# Patient Record
Sex: MALE | Race: WHITE
[De-identification: names, ages, dates, MRNs, and addresses within clinical notes are randomized per-mention and may not be internally consistent; named-entity substitution may affect disease eponyms.]

---

## 2017-01-12 ENCOUNTER — HOSPITAL ENCOUNTER (OUTPATIENT)
Dept: HOSPITAL 45 - C.LAB1850 | Age: 62
Discharge: HOME | End: 2017-01-12
Attending: SURGERY
Payer: COMMERCIAL

## 2017-01-12 DIAGNOSIS — Z01.812: Primary | ICD-10-CM

## 2017-01-12 DIAGNOSIS — D17.1: ICD-10-CM

## 2017-01-12 LAB
ANION GAP SERPL CALC-SCNC: 10 MMOL/L (ref 3–11)
BASOPHILS # BLD: 0.05 K/UL (ref 0–0.2)
BASOPHILS NFR BLD: 0.6 %
BUN SERPL-MCNC: 22 MG/DL (ref 7–18)
BUN/CREAT SERPL: 21.7 (ref 10–20)
CALCIUM SERPL-MCNC: 9.2 MG/DL (ref 8.5–10.1)
CHLORIDE SERPL-SCNC: 105 MMOL/L (ref 98–107)
CO2 SERPL-SCNC: 25 MMOL/L (ref 21–32)
COMPLETE: YES
CREAT SERPL-MCNC: 0.99 MG/DL (ref 0.6–1.4)
EOSINOPHIL NFR BLD AUTO: 175 K/UL (ref 130–400)
GLUCOSE SERPL-MCNC: 74 MG/DL (ref 70–99)
HCT VFR BLD CALC: 47.3 % (ref 42–52)
IG%: 0.3 %
IMM GRANULOCYTES NFR BLD AUTO: 21.1 %
LYMPHOCYTES # BLD: 1.66 K/UL (ref 1.2–3.4)
MCH RBC QN AUTO: 33 PG (ref 25–34)
MCHC RBC AUTO-ENTMCNC: 37 G/DL (ref 32–36)
MCV RBC AUTO: 89.1 FL (ref 80–100)
MONOCYTES NFR BLD: 7.8 %
NEUTROPHILS # BLD AUTO: 1 %
NEUTROPHILS NFR BLD AUTO: 69.2 %
PMV BLD AUTO: 11.5 FL (ref 7.4–10.4)
POTASSIUM SERPL-SCNC: 4.3 MMOL/L (ref 3.5–5.1)
RBC # BLD AUTO: 5.31 M/UL (ref 4.7–6.1)
SODIUM SERPL-SCNC: 140 MMOL/L (ref 136–145)
WBC # BLD AUTO: 7.87 K/UL (ref 4.8–10.8)

## 2017-01-13 ENCOUNTER — HOSPITAL ENCOUNTER (OUTPATIENT)
Dept: HOSPITAL 45 - C.CPL | Age: 62
Discharge: HOME | End: 2017-01-13
Attending: SURGERY
Payer: COMMERCIAL

## 2017-01-13 DIAGNOSIS — D17.1: Primary | ICD-10-CM

## 2017-01-17 ENCOUNTER — HOSPITAL ENCOUNTER (OUTPATIENT)
Dept: HOSPITAL 45 - X.SURG | Age: 62
Discharge: HOME | End: 2017-01-17
Attending: SURGERY
Payer: COMMERCIAL

## 2017-01-17 VITALS
BODY MASS INDEX: 27.46 KG/M2 | HEIGHT: 69.02 IN | BODY MASS INDEX: 27.46 KG/M2 | HEIGHT: 69.02 IN | WEIGHT: 185.39 LBS | WEIGHT: 185.39 LBS

## 2017-01-17 VITALS — DIASTOLIC BLOOD PRESSURE: 82 MMHG | HEART RATE: 69 BPM | OXYGEN SATURATION: 98 % | SYSTOLIC BLOOD PRESSURE: 119 MMHG

## 2017-01-17 VITALS — TEMPERATURE: 97.7 F

## 2017-01-17 DIAGNOSIS — N41.0: ICD-10-CM

## 2017-01-17 DIAGNOSIS — D21.3: Primary | ICD-10-CM

## 2017-01-17 DIAGNOSIS — M19.90: ICD-10-CM

## 2017-01-17 DIAGNOSIS — R53.83: ICD-10-CM

## 2017-01-17 DIAGNOSIS — G47.30: ICD-10-CM

## 2017-01-17 NOTE — ANESTHESIA PROGRESS NT - [facility]
Anesthesia Post Op Note


Date & Time


Jan 17, 2017 at 14:19





Vital Signs


Pain Intensity:  0





 Vital Signs Past 12 Hours








  Date Time  Temp Pulse Resp B/P Pulse Ox O2 Delivery O2 Flow Rate FiO2


 


1/17/17 13:49 36.5 71 16 113/70 96 Room Air  


 


1/17/17 12:09 36.6 80 16 124/84 98 Room Air  











Notes


Mental Status:  alert / awake / arousable, participated in evaluation


Pt Amnestic to Procedure:  Yes


Nausea / Vomiting:  adequately controlled


Pain:  adequately controlled


Airway Patency, RR, SpO2:  stable & adequate


BP & HR:  stable & adequate


Hydration State:  stable & adequate


Anesthetic Complications:  no major complications apparent

## 2017-01-17 NOTE — DISCHARGE INSTRUCTIONS-[facility]
Discharge Instructions


Visit


Reason for Visit:  Left Chest Lipoma





Discharge


Discharge Diagnosis / Problem:  lipoma





Discharge Goals


Goal(s):  Decrease discomfort, Improve function, Improve disease control





Activity Recommendations


Activity Limitations:  as noted below


Lifting Limitations:  no more than 25 pounds


Exercise/Sports Limitations:  until after follow-up appointment


May Resume Sexual Activity:  when tolerated


Shower/Bathe:  keep incision dry (may shower over incision on Thur 1/19)


Driving or Machine Use:  resume 1 day after discharge





SPECIAL CARE INSTRUCTIONS:





*  Cover incisions and change daily for comfort/drainage.





*  Leave steri strips in place





*  May use ibuprofen for pain as tolerated.





*  Expect some swelling and bruising.





Call your doctor if:





*  Temperature above 101 degrees





*  Pain not relieved by pain medicine ordered





*  There is increased drainage or redness from any incision





*  You have any unanswered questions or concerns [phone].








FOLLOW UP VISIT:





If not already scheduled, please call the office for a follow-up visit.





for next week- suture removal





OFFICE PHONE NUMBER:





Dr. Van Office Phone Number:  [phone]








Anesthesia


.





Post Anesthesia Instructions:





If you have had General Anesthesia or IV Sedation:





*  Do not drive today.


*  Resume driving when surgeon permits.


*  Do not make important decisions or sign legal documents today.


*  Call surgeon for:





   1.  Temperature elevations greater than 101 degrees F.


   2.  Uncontrollable pain.


   3.  Excessive bleeding.


   4.  Persistent nausea and vomiting.


   5.  Medication intolerance (nausea, vomiting or rash).





*  For nausea and vomiting use only clear liquids such as: tea, soda, bouillon 

until nausea subsides, then gradually increase diet as tolerated.





*  If you have any concerns or questions, call your surgeon's office.  If 

physician is unavailable and it is an emergency, call 911 or go to the nearest 

emergency room.





.





Diet Recommendations


Home Diet:  resume previous diet





Procedures


Procedures Performed:  


Left Chest Lipoma Excision





Pending Studies


Studies pending at discharge:  no





Medical Emergencies








.


Who to Call and When:





Medical Emergencies:  If at any time you feel your situation is an emergency, 

please call 911 immediately.





.





Non-Emergent Contact


Non-Emergency issues call your:  Surgeon





.


.





"Provider Documentation" section prepared by Bogdan Van.

## 2017-01-17 NOTE — [facility] OPERATIVE REPORT
Operative Report


Operative Date


Jan 17, 2017.





Pre-Operative Diagnosis





left chest lipoma





Post-Operative Diagnosis


same





Procedure(s) Performed


excision Lt chest lipoma





Surgeon


dr su





Assistant Surgeon(s)


none





Estimated Blood Loss


5CC





Findings


4x3.5 cm intramuscular lipoma





Specimens





a lipoma left chest





Anesthesia


local / sedation





Complication(s)


None





Disposition


Recovery Room / PACU


I attest to the content of the Intraoperative Record and any orders documented 

therein.  Any exceptions are noted below.

## 2017-01-17 NOTE — OPERATIVE REPORT
DATE OF OPERATION:  01/17/2017

 

PREOPERATIVE DIAGNOSIS:  Left chest lipoma.

 

POSTOPERATIVE DIAGNOSIS:  Same.  

 

NAME OF OPERATION:  Excision of 4 x 3.5 cm lipoma.

 

FINDINGS:  The lipoma was intermuscular in the serratus muscle on the left

side of the chest.

 

STAFF SURGEON: Dr. Van.

 

ANESTHESIA:  1%  plain lidocaine with sedation.

 

PROCEDURE:  The patient was brought in the operating room and placed on the

operating room table in supine position.  His left arm was extended onto an

arm board.  His left chest was prepped and draped in usual fashion.  Using 1%

plain lidocaine incision was made in the left chest, carrying dissection down

through the subcutaneous tissue and then the latissimus dorsi muscle  was

posterior and the lipoma was obviously intramuscular within the serratus

muscle.  It was gently dissected away from the muscle fibers being careful

not to cut the muscle fibers and excising the lipoma which was 4 x 3.5 cm in

diameter.  The deep muscle was reapproximated in the bed of the lipoma using

2-0 chromic catgut suture and then the subcutaneous tissue reapproximated

using 2-0 plain catgut suture, then the skin reapproximated using

subcuticular 5-0 Monocryl with the knots  on the outside of the skin with

Steri-Strips applied.  The patient was transferred to recovery room in stable

condition.

 

 

I attest to the content of the Intraoperative Record and any orders documented therein. Any exceptio
ns are noted below.

## 2017-07-26 ENCOUNTER — HOSPITAL ENCOUNTER (OUTPATIENT)
Dept: HOSPITAL 45 - C.LAB1850 | Age: 62
Discharge: HOME | End: 2017-07-26
Attending: INTERNAL MEDICINE
Payer: COMMERCIAL

## 2017-07-26 DIAGNOSIS — E78.00: Primary | ICD-10-CM

## 2017-07-26 DIAGNOSIS — N40.1: ICD-10-CM

## 2017-07-26 LAB
ALT SERPL-CCNC: 40 U/L (ref 12–78)
ANION GAP SERPL CALC-SCNC: 5 MMOL/L (ref 3–11)
AST SERPL-CCNC: 20 U/L (ref 15–37)
BUN SERPL-MCNC: 25 MG/DL (ref 7–18)
BUN/CREAT SERPL: 24.7 (ref 10–20)
CALCIUM SERPL-MCNC: 9.2 MG/DL (ref 8.5–10.1)
CHLORIDE SERPL-SCNC: 107 MMOL/L (ref 98–107)
CHOLEST/HDLC SERPL: 3 {RATIO}
CO2 SERPL-SCNC: 30 MMOL/L (ref 21–32)
CREAT SERPL-MCNC: 1 MG/DL (ref 0.6–1.4)
GLUCOSE SERPL-MCNC: 87 MG/DL (ref 70–99)
GLUCOSE UR QL: 61 MG/DL
KETONES UR QL STRIP: 104 MG/DL
NITRITE UR QL STRIP: 78 MG/DL (ref 0–150)
PH UR: 181 MG/DL (ref 0–200)
POTASSIUM SERPL-SCNC: 4.7 MMOL/L (ref 3.5–5.1)
PSA SERPL-MCNC: 0.7 NG/ML (ref 0–4)
SODIUM SERPL-SCNC: 142 MMOL/L (ref 136–145)
VERY LOW DENSITY LIPOPROT CALC: 16 MG/DL

## 2018-02-19 ENCOUNTER — HOSPITAL ENCOUNTER (OUTPATIENT)
Dept: HOSPITAL 45 - C.GI | Age: 63
Discharge: HOME | End: 2018-02-19
Attending: INTERNAL MEDICINE
Payer: COMMERCIAL

## 2018-02-19 VITALS
BODY MASS INDEX: 26.54 KG/M2 | WEIGHT: 185.39 LBS | HEIGHT: 70 IN | WEIGHT: 185.39 LBS | HEIGHT: 70 IN | BODY MASS INDEX: 26.54 KG/M2

## 2018-02-19 VITALS — OXYGEN SATURATION: 98 % | SYSTOLIC BLOOD PRESSURE: 121 MMHG | DIASTOLIC BLOOD PRESSURE: 88 MMHG | HEART RATE: 71 BPM

## 2018-02-19 DIAGNOSIS — Z12.11: Primary | ICD-10-CM

## 2018-02-19 DIAGNOSIS — G47.33: ICD-10-CM

## 2018-02-19 DIAGNOSIS — K64.8: ICD-10-CM

## 2018-02-19 DIAGNOSIS — M19.90: ICD-10-CM

## 2018-02-19 DIAGNOSIS — Z85.820: ICD-10-CM

## 2018-02-19 DIAGNOSIS — Z99.89: ICD-10-CM

## 2018-02-19 DIAGNOSIS — Z90.49: ICD-10-CM

## 2018-02-19 DIAGNOSIS — N40.0: ICD-10-CM

## 2018-02-19 NOTE — GI REPORT
Procedure Date: 2/19/2018 10:33 AM

Procedure:            Colonoscopy

Indications:          Screening for colorectal malignant neoplasm

Medicines:            Monitored Anesthesia Care

Complications:        No immediate complications.

Estimated Blood Loss: Estimated blood loss: none.

Procedure:            Pre-Anesthesia Assessment:

                      - Prior to the procedure, a History and Physical was 

                      performed, and patient medications and allergies were 

                      reviewed. The patient's tolerance of previous 

                      anesthesia was also reviewed. The risks and benefits of 

                      the procedure and the sedation options and risks were 

                      discussed with the patient. All questions were 

                      answered, and informed consent was obtained. Prior 

                      Anticoagulants: The patient has taken no previous 

                      anticoagulant or antiplatelet agents. ASA Grade 

                      Assessment: I - A normal, healthy patient. After 

                      reviewing the risks and benefits, the patient was 

                      deemed in satisfactory condition to undergo the 

                      procedure.

                      After I obtained informed consent, the scope was passed 

                      under direct vision. Throughout the procedure, the 

                      patient's blood pressure, pulse, and oxygen saturations 

                      were monitored continuously. The scope was introduced 

                      through the anus and advanced to the terminal ileum. 

                      The colonoscopy was performed without difficulty. The 

                      patient tolerated the procedure well. The quality of 

                      the bowel preparation was good. The terminal ileum, 

                      ileocecal valve, appendiceal orifice, and rectum were 

                      photographed.

Findings:

     The perianal and digital rectal examinations were normal.

     Non-bleeding internal hemorrhoids were found during retroflexion. The 

     hemorrhoids were small.

Impression:           - Non-bleeding internal hemorrhoids.

                      - No specimens collected.

Recommendation:       - Resume previous diet.

                      - Continue present medications.

                      - Repeat colonoscopy in 10 years for surveillance.

                      - Return to primary care physician as previously 

                      scheduled.

Doni Callahan DO

2/19/2018 11:17:52 AM

This report has been signed electronically.

Note Initiated On: 2/19/2018 10:33 AM

     I attest to the content of the Intraoperative Record and orders 

     documented therein, exceptions below

## 2018-02-19 NOTE — DISCHARGE INSTRUCTIONS
Endoscopy Patient Instructions


Date / Procedure(s) Performed


Feb 19, 2018.


Colonoscopy





Allergy Information


Coded Allergies:  


     No Known Allergies (Verified , 2/19/18)





Discharge Date / Findings


Feb 19, 2018.


Internal hemorrhoids





Medication Instructions


OK to resume all medications today as prescribed





Reported Home Medications








 Medications  Dose


 Route/Sig


 Max Daily Dose Days Date Category


 


 Advil (Ibuprofen)


 200 Mg Tab  200-600 Mg


 PO Q4H PRN


    1/11/17 Reported


 


 Omega-3 (Fish


 Oil) 1 Ea Cap  1 Cap


 PO QAM


    1/11/17 Reported


 


 Probiotic


  (Probiotic


 Product) 1 Cap Cap  1 Cap


 PO QAM


    12/16/15 Reported


 


 Allegra Allergy


  (Fexofenadine


 Hcl) 180 Mg Tab  1 Tab


 PO QAM


    12/16/15 Reported











Provider Instructions





Activity Restrictions





-  No exercising or heavy lifting for 24 hours. 


-  Do not drink alcohol the day of the procedure.


-  Do not drive a car or operate machinery until the day after the procedure.


-  Do not make any important decisions or sign important papers in 24 hours 

after the procedure.





Following Day:





-  Return to full activity which may include returning to work/school.





Diet





Start your diet with liquids and light foods (jello, soup, juice, toast).  Then 

eat your usual diet if not nauseated.





Treatment For Common After Affects





For mild abdominal pain, bloating, or excessive gas:





-  Rest


-  Eat lightly


-  Lie on right side





Follow-Up Information


Follow-up with Dr. Noel Prakash as scheduled





Anesthesia Information





What You Should Know





You have had a procedure that required some medicine to reduce anxiety and 

discomfort. This treatment is called moderate sedation.  


After receiving the treatment, you may be sleepy, but you will be able to 

breathe on your own.  The effects of the treatment may last for several hours.








Follow these instructions along with Activity/Diet recommendations noted above:





*  Do NOT do anything where dizziness or clumsiness would be dangerous.





*  Rest quietly at home today, then you can be up and about tomorrow.





*  Have a responsible person stay with you the rest of today.





*  You may have had an I.V. today.  If so, you may take the dressing off later 

today.





Recommendations


 


Call your doctor if:





*  Trouble breathing 





*  Continuous vomiting for more than 24 hours








*  Temperature above 101 degrees





*  Severe abdominal pain or bloating





*  Pain not relieved by pain medicine ordered





*  There is increased drainage or redness from any incision





*  A large amount of rectal bleeding greater than 2-3 tablespoons. 


   (If you had a polyp/s removed or have hemorrhoids, a small amount of blood -


    from the rectum is to be expected.)





*  You have any unanswered questions or concerns.








IN THE EVENT OF A SERIOUS EMERGENCY, GO TO THE NEAREST EMERGENCY ROOM








       Your discharge instructions were prepared by provider Doni Callahan.





 Patient Instructions Signature Page














Noel Slaughter 











Patient (or Guardian) Signature/Date:____________________________________ I 

have read and understand the instructions given to me by my caregivers.








Caregiver/RN/Doctor Signature/Date:____________________________________











The above-named patient and/or guardian has received patient instructions on 

this date.





























+  Original Patient Signature Page (only) stays with chart.  Please make copy 

for patient.

## 2018-02-19 NOTE — ANESTHESIOLOGY PROGRESS NOTE
Anesthesia Post Op Note


Date & Time


Feb 19, 2018 at 11:40





Vital Signs


Pain Intensity:  0





Vital Signs Past 12 Hours








  Date Time  Temp Pulse Resp B/P (MAP) Pulse Ox O2 Delivery O2 Flow Rate FiO2


 


2/19/18 11:15  60 18 103/65 (78) 95 Room Air  


 


2/19/18 10:07 36.5 84 18 149/89 (109) 95 Room Air  











Notes


Mental Status:  alert / awake / arousable, participated in evaluation


Pt Amnestic to Procedure:  Yes


Nausea / Vomiting:  adequately controlled


Pain:  adequately controlled


Airway Patency, RR, SpO2:  stable & adequate


BP & HR:  stable & adequate


Hydration State:  stable & adequate


Anesthetic Complications:  no major complications apparent

## 2018-02-19 NOTE — ENDO HISTORY AND PHYSICAL
History & Physical


Date of Service:


Feb 19, 2018.


Chief Complaint:


screening


Referring Physician:


Dr. Noel Prakash


History of Present Illness


63 yo CM who presents for screening colonoscopy.





Past Surgical History


Hx Cardiac Surgery:  No


Hx Internal Defibrillator:  No


Hx Pacemaker:  No


Hx Abdominal Surgery:  Yes (APPY)


Hx of Implantable Prosthesis:  No


Hx Post-Op Nausea and Vomiting:  No


Hx Cancer Surgery:  Yes (EAR/FOREHEAD MOHS PROCEDURE)


Hx Thoracic Surgery:  No


Hx Orthopedic:  No


Hx Urinary Tract Surgery:  No





Family History


None





Social History


Smoking Status:  Never Smoker


Hx Substance Use:  No


Hx Alcohol Use:  Yes ("VERY LITTLE")





Allergies


Coded Allergies:  


     No Known Allergies (Verified , 2/19/18)





Current Medications





Reported Home Medications








 Medications  Dose


 Route/Sig


 Max Daily Dose Days Date Category


 


 Advil (Ibuprofen)


 200 Mg Tab  200-600 Mg


 PO Q4H PRN


    1/11/17 Reported


 


 Omega-3 (Fish


 Oil) 1 Ea Cap  1 Cap


 PO QAM


    1/11/17 Reported


 


 Probiotic


  (Probiotic


 Product) 1 Cap Cap  1 Cap


 PO QAM


    12/16/15 Reported


 


 Allegra Allergy


  (Fexofenadine


 Hcl) 180 Mg Tab  1 Tab


 PO QAM


    12/16/15 Reported











Vital Signs


Weight (Kilograms):  84.09


Height (Feet):  5


Height (Inches):  10











  Date Time  Temp Pulse Resp B/P (MAP) Pulse Ox O2 Delivery O2 Flow Rate FiO2


 


2/19/18 10:07 36.5 84 18 149/89 (109) 95 Room Air  











Physical Exam


General Appearance:  WD/WN, no apparent distress


Respiratory/Chest:  


   Auscultation:  breath sounds normal


Cardiovascular:  


   Heart Auscultation:  RRR


Abdomen:  


   Bowel Sounds:  normal


   Inspection & Palpation:  soft, non-distended, no tenderness, guarding & 

rebound





Assessment and Plan


Assessment:


63 yo CM who presents for screening colonoscopy.








Plan:


Proceed with colonoscopy.